# Patient Record
Sex: FEMALE | Race: BLACK OR AFRICAN AMERICAN | URBAN - METROPOLITAN AREA
[De-identification: names, ages, dates, MRNs, and addresses within clinical notes are randomized per-mention and may not be internally consistent; named-entity substitution may affect disease eponyms.]

---

## 2018-05-17 ENCOUNTER — OFFICE VISIT (OUTPATIENT)
Dept: INTERNAL MEDICINE CLINIC | Age: 18
End: 2018-05-17

## 2018-05-17 VITALS
RESPIRATION RATE: 16 BRPM | OXYGEN SATURATION: 98 % | HEART RATE: 87 BPM | DIASTOLIC BLOOD PRESSURE: 79 MMHG | BODY MASS INDEX: 37.15 KG/M2 | HEIGHT: 65 IN | SYSTOLIC BLOOD PRESSURE: 117 MMHG | WEIGHT: 223 LBS | TEMPERATURE: 98.6 F

## 2018-05-17 DIAGNOSIS — R21 RASH AND NONSPECIFIC SKIN ERUPTION: ICD-10-CM

## 2018-05-17 DIAGNOSIS — Z87.2 HISTORY OF ECZEMA: ICD-10-CM

## 2018-05-17 DIAGNOSIS — E66.9 OBESITY, UNSPECIFIED CLASSIFICATION, UNSPECIFIED OBESITY TYPE, UNSPECIFIED WHETHER SERIOUS COMORBIDITY PRESENT: ICD-10-CM

## 2018-05-17 DIAGNOSIS — Z76.89 ENCOUNTER TO ESTABLISH CARE: Primary | ICD-10-CM

## 2018-05-17 DIAGNOSIS — Z11.3 SCREENING FOR STD (SEXUALLY TRANSMITTED DISEASE): ICD-10-CM

## 2018-05-17 NOTE — MR AVS SNAPSHOT
[address] [facility] 
 
 
 [address] [place] [address] [facility] 
[phone] Patient: Nathan Bernard MRN: [id number] FRS:8/52/[id number] Visit Information Date & Time Provider Department Dept. Phone Encounter #  
 5/17/2018 10:45 AM Dina Alvarenga MD [facility] [address]  [id number] Upcoming Health Maintenance Date Due Hepatitis A Peds Age 1-18 (1 of 2 - Standard Series) 1/19/1999 DTaP/Tdap/Td series (1 - Tdap) 1/19/2005 HPV Age 9Y-34Y (1 of 3 - Female 3 Dose Series) 1/19/2009 Influenza Age 5 to Adult 8/1/2018 Allergies as of 5/17/2018  Review Complete On: 1/24/4195 By: Bharat Burt Wears No Known Allergies Current Immunizations  Never Reviewed No immunizations on file. Not reviewed this visit You Were Diagnosed With   
  
 Codes Comments Encounter to establish care    -  Primary ICD-10-CM: Z76.89 
ICD-9-CM: V65.8 Rash and nonspecific skin eruption     ICD-10-CM: R21 
ICD-9-CM: 782.1 History of eczema     ICD-10-CM: Z87.2 ICD-9-CM: V13.3 Screening for STD (sexually transmitted disease)     ICD-10-CM: Z11.3 ICD-9-CM: V74.5 Vitals BP Pulse Temp Resp Height(growth percentile) Weight(growth percentile) 117/79 (BP 1 Location: Left arm, BP Patient Position: Sitting) 87 98.6 °F (37 °C) (Oral) 16 5' 4.5\" (1.638 m) 223 lb (101.2 kg) LMP SpO2 BMI OB Status Smoking Status 04/29/2018 98% 37.69 kg/m2 IUD Never Smoker Vitals History BMI and BSA Data Body Mass Index Body Surface Area  
 37.69 kg/m 2 2.15 m 2 Preferred Pharmacy Pharmacy Name Phone [facility] [address] [place] [address] [phone] Your Updated Medication List  
  
   
This list is accurate as of 5/17/18 11:44 AM.  Always use your most recent med list.  
  
  
  
  
 * diphenhydrAMINE hcl 2 % topical gel Commonly known as:  BENADRYL Apply  to affected area every four (4) hours as needed for Allergies. * diphenhydrAMINE hcl 2 % topical gel Commonly known as:  BENADRYL Apply  to affected area every four (4) hours as needed for Allergies. KINSEY 14 mcg/24 hour (3 years) Iud IUD Generic drug:  levonorgestrel 1 Each by IntraUTERine route once. * Notice: This list has 2 medication(s) that are the same as other medications prescribed for you. Read the directions carefully, and ask your doctor or other care provider to review them with you. Prescriptions Sent to Pharmacy Refills diphenhydrAMINE hcl (BENADRYL) 2 % topical gel 1 Sig: Apply  to affected area every four (4) hours as needed for Allergies. Class: Normal  
 Pharmacy: [facility] [place] [address] Ph #: [phone] Route: Topical  
  
We Performed the Following CBC WITH AUTOMATED DIFF [96402 CPT(R)] HIV 1/2 AG/AB, 4TH GENERATION,W RFLX CONFIRM [id number] CPT(R)] METABOLIC PANEL, COMPREHENSIVE [37436 CPT(R)] RPR [76712 CPT(R)] VZV AB, IGG [id number] CPT(R)] VZV AB, IGM [id number] CPT(R)] Introducing [facility]! Karolyn Sneed introduces [employer] patient portal. Now you can access parts of your medical record, email your doctor's office, and request medication refills online. 1. In your internet browser, go to [email or web address]!. [email or web address]! 2. Click on the First Time User? Click Here link in the Sign In box. You will see the New Member Sign Up page. 3. Enter your [employer] Access Code exactly as it appears below. You will not need to use this code after youve completed the sign-up process. If you do not sign up before the expiration date, you must request a new code. · [employer] Access Code: [email or web address] Expires: 8/15/2018 11:44 AM 
 
4. Enter the last four digits of your Social Security Number (xxxx) and Date of Birth (mm/dd/yyyy) as indicated and click Submit.  You will be taken to the next sign-up page. 5. Create a [employer] ID. This will be your [employer] login ID and cannot be changed, so think of one that is secure and easy to remember. 6. Create a [employer] password. You can change your password at any time. 7. Enter your Password Reset Question and Answer. This can be used at a later time if you forget your password. 8. Enter your e-mail address. You will receive e-mail notification when new information is available in [address]. 9. Click Sign Up. You can now view and download portions of your medical record. 10. Click the Download Summary menu link to download a portable copy of your medical information. If you have questions, please visit the Frequently Asked Questions section of the [employer] website. Remember, [employer] is NOT to be used for urgent needs. For medical emergencies, dial 911. Now available from your iPhone and Android! Please provide this summary of care documentation to your next provider. If you have any questions after today's visit, please call [phone].

## 2018-05-17 NOTE — PROGRESS NOTES
Fanta Duran is a 19 y.o. female who presents today for Establish Care and Rash  . She has a history of There is no problem list on file for this patient. .    Today patient is here to establish care. Previous PCP Peds, Ansley Llamas. she is switching because adult. Records are not available for me to review. she does not have other concerns. Problem visit:  Fanta Duran is here for complaint of rash. Problem began 3 week(s) ago. Severity is moderate  Character of problem: Raised red lesions that were very pruritic. Heat makes the problem worse. Nothing makes the problem better. Associated symptoms include: No systemic symptoms. Denies changes in soaps or lotions. Has a history of eczema. Lives in [place] for school. Social: in ko year of college (multimedia and elementary education). 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 No tobacco, No EtOH. Sexually active with one partner. GYN: sees GYN locally. Family: Father:  from ? Heart issues. Mother: Arthritis. ROS  Review of Systems   Constitutional: Negative for chills, fever and weight loss. Respiratory: Negative for cough and shortness of breath. Cardiovascular: Negative for chest pain, palpitations and leg swelling. Gastrointestinal: Negative for abdominal pain, nausea and vomiting. Skin: Positive for itching and rash. Neurological: Negative. Endo/Heme/Allergies: Does not bruise/bleed easily. Psychiatric/Behavioral: Negative for depression. The patient is not nervous/anxious. Visit Vitals    /79 (BP 1 Location: Left arm, BP Patient Position: Sitting)    Pulse 87    Temp 98.6 °F (37 °C) (Oral)    Resp 16    Ht 5' 4.5\" (1.638 m)    Wt 223 lb (101.2 kg)    SpO2 98%    BMI 37.69 kg/m2       Physical Exam   Constitutional: She is oriented to person, place, and time and well-developed, well-nourished, and in no distress. No distress. HENT:   Head: Normocephalic and atraumatic. Mouth/Throat: No oropharyngeal exudate. Eyes: Conjunctivae are normal. Pupils are equal, round, and reactive to light. No scleral icterus. Neck: Normal range of motion. No JVD present. No thyromegaly present. Cardiovascular: Normal rate and regular rhythm. Exam reveals no gallop and no friction rub. No murmur heard. Pulmonary/Chest: Effort normal and breath sounds normal. No respiratory distress. She has no wheezes. Abdominal: Soft. Bowel sounds are normal. She exhibits no distension. There is no rebound and no guarding. Musculoskeletal: Normal range of motion. She exhibits no edema. Neurological: She is alert and oriented to person, place, and time. No cranial nerve deficit. Skin: Skin is dry. Rash noted. Mild raised rash to arms and legs. In different stages of healing. Do not appear infected. Resolving. Does not affect face or palms. Psychiatric: Mood, memory, affect and judgment normal.       Current Outpatient Prescriptions   Medication Sig    levonorgestrel (KINSEY) 14 mcg/24 hour (3 years) IUD IUD 1 Each by IntraUTERine route once.  diphenhydrAMINE hcl (BENADRYL) 2 % topical gel Apply  to affected area every four (4) hours as needed for Allergies.  diphenhydrAMINE hcl (BENADRYL) 2 % topical gel Apply  to affected area every four (4) hours as needed for Allergies. No current facility-administered medications for this visit. Past Medical History:   Diagnosis Date    Eczema       History reviewed. No pertinent surgical history. Social History   Substance Use Topics    Smoking status: Never Smoker    Smokeless tobacco: Never Used    Alcohol use No      Family History   Problem Relation Age of Onset    Arthritis-rheumatoid Mother     Heart Attack Maternal Grandmother     Hypertension Maternal Grandmother     Diabetes Maternal Grandfather         No Known Allergies     Assessment/Plan  Diagnoses and all orders for this visit:    1.  Encounter to establish care - Will get immunization records  -     RPR  -     HIV 1/2 AG/AB, 4TH GENERATION,W RFLX CONFIRM    2. Rash and nonspecific skin eruption - Unclear cause. Resolving. Appears viral, ? If VZV vaccine. Will check IgM. PRN antihistamine lotion. See JRD if does not improve. -     VZV AB, IGM  -     VZV AB, IGG  -     CBC WITH AUTOMATED DIFF  -     METABOLIC PANEL, COMPREHENSIVE  -     diphenhydrAMINE hcl (BENADRYL) 2 % topical gel; Apply  to affected area every four (4) hours as needed for Allergies. 3. History of eczema - not current rash. 4. Screening for STD (sexually transmitted disease)  -     RPR  -     HIV 1/2 AG/AB, 4TH GENERATION,W RFLX CONFIRM    5. Obesity, unspecified classification, unspecified obesity type, unspecified whether serious comorbidity present - counseled. On weight loss.          Follow-up Disposition: Not on File    Chidi Gayle MD  5/17/2018

## 2018-05-18 LAB
ALBUMIN SERPL-MCNC: 3.9 G/DL (ref 3.5–5.5)
ALBUMIN/GLOB SERPL: 1.3 {RATIO} (ref 1.2–2.2)
ALP SERPL-CCNC: 59 IU/L (ref 39–117)
ALT SERPL-CCNC: 8 IU/L (ref 0–32)
AST SERPL-CCNC: 16 IU/L (ref 0–40)
BASOPHILS # BLD AUTO: 0 X10E3/UL (ref 0–0.2)
BASOPHILS NFR BLD AUTO: 0 %
BILIRUB SERPL-MCNC: 0.2 MG/DL (ref 0–1.2)
BUN SERPL-MCNC: 10 MG/DL (ref 6–20)
BUN/CREAT SERPL: 16 (ref 9–23)
CALCIUM SERPL-MCNC: 8.8 MG/DL (ref 8.7–10.2)
CHLORIDE SERPL-SCNC: 104 MMOL/L (ref 96–106)
CO2 SERPL-SCNC: 24 MMOL/L (ref 18–29)
CREAT SERPL-MCNC: 0.64 MG/DL (ref 0.57–1)
EOSINOPHIL # BLD AUTO: 0.1 X10E3/UL (ref 0–0.4)
EOSINOPHIL NFR BLD AUTO: 2 %
ERYTHROCYTE [DISTWIDTH] IN BLOOD BY AUTOMATED COUNT: 13.2 % (ref 12.3–15.4)
GFR SERPLBLD CREATININE-BSD FMLA CKD-EPI: 129 ML/MIN/1.73
GFR SERPLBLD CREATININE-BSD FMLA CKD-EPI: 149 ML/MIN/1.73
GLOBULIN SER CALC-MCNC: 2.9 G/DL (ref 1.5–4.5)
GLUCOSE SERPL-MCNC: 94 MG/DL (ref 65–99)
HCT VFR BLD AUTO: 37.1 % (ref 34–46.6)
HGB BLD-MCNC: 12 G/DL (ref 11.1–15.9)
HIV 1+2 AB+HIV1 P24 AG SERPL QL IA: NON REACTIVE
IMM GRANULOCYTES # BLD: 0 X10E3/UL (ref 0–0.1)
IMM GRANULOCYTES NFR BLD: 0 %
LYMPHOCYTES # BLD AUTO: 2.3 X10E3/UL (ref 0.7–3.1)
LYMPHOCYTES NFR BLD AUTO: 33 %
MCH RBC QN AUTO: 28.4 PG (ref 26.6–33)
MCHC RBC AUTO-ENTMCNC: 32.3 G/DL (ref 31.5–35.7)
MCV RBC AUTO: 88 FL (ref 79–97)
MONOCYTES # BLD AUTO: 0.7 X10E3/UL (ref 0.1–0.9)
MONOCYTES NFR BLD AUTO: 9 %
NEUTROPHILS # BLD AUTO: 4 X10E3/UL (ref 1.4–7)
NEUTROPHILS NFR BLD AUTO: 56 %
PLATELET # BLD AUTO: 285 X10E3/UL (ref 150–379)
POTASSIUM SERPL-SCNC: 4.3 MMOL/L (ref 3.5–5.2)
PROT SERPL-MCNC: 6.8 G/DL (ref 6–8.5)
RBC # BLD AUTO: 4.22 X10E6/UL (ref 3.77–5.28)
RPR SER QL: NON REACTIVE
SODIUM SERPL-SCNC: 141 MMOL/L (ref 134–144)
VZV IGG SER IA-ACNC: 528 INDEX
VZV IGM SER IA-ACNC: <0.91 INDEX (ref 0–0.9)
WBC # BLD AUTO: 7.2 X10E3/UL (ref 3.4–10.8)

## 2018-06-20 ENCOUNTER — TELEPHONE (OUTPATIENT)
Dept: INTERNAL MEDICINE CLINIC | Age: 18
End: 2018-06-20

## 2018-06-20 DIAGNOSIS — Z01.82 ENCOUNTER FOR ALLERGY TESTING: Primary | ICD-10-CM

## 2018-06-20 DIAGNOSIS — Z87.2 HISTORY OF CHRONIC ECZEMA: ICD-10-CM

## 2018-06-20 DIAGNOSIS — R21 RASH AND OTHER NONSPECIFIC SKIN ERUPTION: ICD-10-CM

## 2018-06-25 NOTE — TELEPHONE ENCOUNTER
----- Message from Ramonia Power sent at 6/20/2018  9:16 AM EDT -----  Regarding: Dr. Sanchez Sigifredo  The patient is requesting that the doctor sends a referral to Dr. Abdul at [place] [address] [facility].  The patient's appointment is on 7/3/18. (Dr. Michoacano Galvez (u)[phone]
Please sign pending referral for Z01.82 / Theodora Carpenter / Z87.2 - thank you
Referral has been Obtained & Faxed To Dr Dulce Rubio Call Office [phone]    Auth #  [id number]  No # Visits  99  Dates Covered  06/25/2018 - 06/25/2019
all other ROS negative except as per HPI

## 2019-05-03 ENCOUNTER — OFFICE VISIT (OUTPATIENT)
Dept: FAMILY MEDICINE CLINIC | Age: 19
End: 2019-05-03

## 2019-05-03 VITALS
RESPIRATION RATE: 16 BRPM | HEART RATE: 84 BPM | WEIGHT: 200.4 LBS | HEIGHT: 65 IN | DIASTOLIC BLOOD PRESSURE: 70 MMHG | BODY MASS INDEX: 33.39 KG/M2 | SYSTOLIC BLOOD PRESSURE: 104 MMHG | OXYGEN SATURATION: 95 % | TEMPERATURE: 99.1 F

## 2019-05-03 DIAGNOSIS — Z13.31 SCREENING FOR DEPRESSION: ICD-10-CM

## 2019-05-03 DIAGNOSIS — Z23 ENCOUNTER FOR IMMUNIZATION: ICD-10-CM

## 2019-05-03 DIAGNOSIS — Z00.00 WELL WOMAN EXAM (NO GYNECOLOGICAL EXAM): ICD-10-CM

## 2019-05-03 DIAGNOSIS — Z76.89 ENCOUNTER TO ESTABLISH CARE: Primary | ICD-10-CM

## 2019-05-03 DIAGNOSIS — J03.01 RECURRENT STREPTOCOCCAL TONSILLITIS: ICD-10-CM

## 2019-05-03 DIAGNOSIS — Z11.3 ROUTINE SCREENING FOR STI (SEXUALLY TRANSMITTED INFECTION): ICD-10-CM

## 2019-05-03 DIAGNOSIS — E66.09 CLASS 1 OBESITY DUE TO EXCESS CALORIES WITHOUT SERIOUS COMORBIDITY WITH BODY MASS INDEX (BMI) OF 33.0 TO 33.9 IN ADULT: ICD-10-CM

## 2019-05-03 DIAGNOSIS — L30.9 ECZEMA, UNSPECIFIED TYPE: ICD-10-CM

## 2019-05-03 DIAGNOSIS — J30.2 SEASONAL ALLERGIC RHINITIS, UNSPECIFIED TRIGGER: ICD-10-CM

## 2019-05-03 NOTE — PROGRESS NOTES
Chief Complaint Patient presents with  New Patient  
  establish care  Other Refferal for ENT  Complete Physical  
  annual  
 
1. Have you been to the ER, urgent care clinic since your last visit? Hospitalized since your last visit yes for [facility] in [place] 3 weeks ago 2. Have you seen or consulted any other health care providers outside of the [address] [place] [address] since your last visit? Include any pap smears or colon screening.  No

## 2019-05-03 NOTE — PATIENT INSTRUCTIONS

## 2019-05-03 NOTE — PROGRESS NOTES
[facility] Note Subjective: Chief Complaint Patient presents with  New Patient  
  establish care  Other Refferal for ENT  Complete Physical  
  annual  
 
Davy Velazquez is a 22y.o. year old female who presents for evaluation of the following: 
 
Establishment of Care: 
Previous PCP: Pediatrician fabio funez seen 3 years ago Care Team:  
Dentist- Dr. Litzy Judd Optho- name unknwn GYN- Plans to establish care Patient Care Team: 
Melanie Valerio MD as PCP - [facility]) PMH:  
Nearsighted Wears contacts Eczema/ Nickel Allergy Location abdomen, arms, legs Tx: shea butter, cream uknown name Seasonal allergies: Tx: none Recurrent Strep Testin infection in past 1 year No current sore throat: fever Previous Tx: amoxicillin Request ENT referral  
 
 
Obesity:  
Body mass index is 33.59 kg/m². \"I'm happy\" Heaviest weight 250 Current weight Last 3 Recorded Weights in this Encounter 19 1319 Weight: 200 lb 6.4 oz (90.9 kg) Diet: usually no meat, limited dairy. Activity: None Acute Concerns: 
None Social:  
Works- Student at [employer]; rising senior, works as [employer]  Lives with mother Health Maintenance:  
Health Maintenance Topic Date Due  
 DTaP/Tdap/Td series (1 - Tdap) 2019  PAP AKA CERVICAL CYTOLOGY  2019  HPV Age 9Y-34Y (1 - Female 3-dose series) 2020 (Originally 2013)  Influenza Age 5 to Adult  2019  Pneumococcal 0-64 years  Aged Out HIV or other STD testing: Due 
Domestic Violence Screen:  
Abuse Screening Questionnaire 5/3/2019 Do you ever feel afraid of your partner? Genetta Croon Are you in a relationship with someone who physically or mentally threatens you? Genetta Croon Is it safe for you to go home? Siva Smith Depression Screen: Denies depression or anhedonia  
3 most recent PHQ Screens 5/3/2019 Little interest or pleasure in doing things Not at all Feeling down, depressed, irritable, or hopeless Not at all Total Score PHQ 2 0 Smoker? never G0 Pap:  Never. Wants to have done with GYN Mammogram: No family history Patient's last menstrual period was 04/30/2019 (exact date). Menses Monthly, lasting 3-5 days. No recent worsening bleeding or intermenstrual bleeding 
 reports that she currently engages in sexual activity and has had partners who are Male. She reports using the following method of birth control/protection: IUD. Review of Systems Pertinent positives and negative per HPI. All other systems  reviewed are negative for a Comprehensive ROS (10+). Past Medical History:  
Diagnosis Date  Eczema Social History Socioeconomic History  Marital status: SINGLE Spouse name: Not on file  Number of children: Not on file  Years of education: Not on file  Highest education level: Not on file Occupational History  Not on file Social Needs  Financial resource strain: Not on file  Food insecurity:  
  Worry: Not on file Inability: Not on file  Transportation needs:  
  Medical: Not on file Non-medical: Not on file Tobacco Use  Smoking status: Never Smoker  Smokeless tobacco: Never Used Substance and Sexual Activity  Alcohol use: Yes Comment: socially  Drug use: No  
 Sexual activity: Yes  
  Partners: Male Birth control/protection: IUD Lifestyle  Physical activity:  
  Days per week: Not on file Minutes per session: Not on file  Stress: Not on file Relationships  Social connections:  
  Talks on phone: Not on file Gets together: Not on file Attends [employer] service: Not on file Active member of club or organization: Not on file Attends meetings of clubs or organizations: Not on file Relationship status: Not on file  Intimate partner violence: Fear of current or ex partner: Not on file Emotionally abused: Not on file Physically abused: Not on file Forced sexual activity: Not on file Other Topics Concern  Not on file Social History Narrative  Not on file Family History Problem Relation Age of Onset  Arthritis-rheumatoid Mother  Heart Attack Maternal Grandmother  Hypertension Maternal Grandmother  Diabetes Maternal Grandfather  Heart Disease Father Current Outpatient Medications Medication Sig  levonorgestrel (KINSEY) 14 mcg/24 hour (3 years) IUD IUD 1 Each by IntraUTERine route once.  diphenhydrAMINE hcl (BENADRYL) 2 % topical gel Apply  to affected area every four (4) hours as needed for Allergies.  diphenhydrAMINE hcl (BENADRYL) 2 % topical gel Apply  to affected area every four (4) hours as needed for Allergies. No current facility-administered medications for this visit. Objective:  
 
Vitals:  
 05/03/19 1319 BP: 104/70 Pulse: 84 Resp: 16 Temp: 99.1 °F (37.3 °C) TempSrc: Oral  
SpO2: 95% Weight: 200 lb 6.4 oz (90.9 kg) Height: 5' 4.76\" (1.645 m) Physical Examination: 
General: Alert, cooperative, no distress, appears stated age. Obese Eyes: Conjunctivae clear. PERRL, EOMs intact. Ears: Normal external ear canals both ears. TM clear and mobile bilaterally Nose: Nares normal. Septum midline. Mucosa normal. No drainage or sinus tenderness. Mouth/Throat: Lips, mucosa, and tongue normal. No oropharyngeal erythema. No tonsillar enlargement or exudate. Neck: Supple, symmetrical, trachea midline, no adenopathy. No thyroid enlargement/tenderness/nodules Respiratory: Breathing comfortably, in no acute respiratory distress. Clear to auscultation bilaterally. Normal inspiratory and expiratory ratio. Cardiovascular: Regular rate and rhythm, S1, S2 normal, no murmur, click, rub or gallop. Extremities with no cyanosis or edema.  Pulses 2+ and symmetric radial and DP Abdomen: Soft, non-tender, non distended. Bowel sounds normal. No masses or organomegaly. Pelvic: Patient declined. MSK: Extremities normal, atraumatic, no effusion. Gait steady and unassisted. Back symmetric, no curvature. ROM normal. No CVA tenderness. Skin: Skin color, texture, turgor normal. No rashes or lesions on exposed skin. Lymph nodes: Cervical, supraclavicular nodes normal. 
Neurologic: CNII-XII intact. Strength 5/5 grossly. Sensation and reflexes normal throughout. Psychiatric: Affect appropriate. Mood euthymic. Thoughts logical. Speech volume and speed normal 
 
 
No visits with results within 3 Month(s) from this visit. Latest known visit with results is:  
Office Visit on 05/17/2018 Component Date Value Ref Range Status  V. zoster Ab, IgM 05/17/2018 <0.91  0.00 - 0.90 index Final  
 Comment:                                Negative          <0.91 Borderline  0.91 - 1.09 Positive          >1.09 
  
 VARICELLA ZOSTER IGG 05/17/2018 528  Immune >165 index Final  
 Comment:                                Negative          <135 Equivocal    135 - 165 Positive          >165 A positive result generally indicates exposure to the 
pathogen or administration of specific immunoglobulins, 
but it is not indication of active infection or stage 
of disease.  
  
 WBC 05/17/2018 7.2  3.4 - 10.8 x10E3/uL Final  
 RBC 05/17/2018 4.22  3.77 - 5.28 x10E6/uL Final  
 HGB 05/17/2018 12.0  11.1 - 15.9 g/dL Final  
 HCT 05/17/2018 37.1  34.0 - 46.6 % Final  
 MCV 05/17/2018 88  79 - 97 fL Final  
 MCH 05/17/2018 28.4  26.6 - 33.0 pg Final  
 MCHC 05/17/2018 32.3  31.5 - 35.7 g/dL Final  
 RDW 05/17/2018 13.2  12.3 - 15.4 % Final  
 PLATELET 51/64/5211 879  150 - 379 x10E3/uL Final  
 NEUTROPHILS 05/17/2018 56  Not Estab. % Final  
  Lymphocytes 05/17/2018 33  Not Estab. % Final  
 MONOCYTES 05/17/2018 9  Not Estab. % Final  
 EOSINOPHILS 05/17/2018 2  Not Estab. % Final  
 BASOPHILS 05/17/2018 0  Not Estab. % Final  
 ABS. NEUTROPHILS 05/17/2018 4.0  1.4 - 7.0 x10E3/uL Final  
 Abs Lymphocytes 05/17/2018 2.3  0.7 - 3.1 x10E3/uL Final  
 ABS. MONOCYTES 05/17/2018 0.7  0.1 - 0.9 x10E3/uL Final  
 ABS. EOSINOPHILS 05/17/2018 0.1  0.0 - 0.4 x10E3/uL Final  
 ABS. BASOPHILS 05/17/2018 0.0  0.0 - 0.2 x10E3/uL Final  
 IMMATURE GRANULOCYTES 05/17/2018 0  Not Estab. % Final  
 ABS. IMM. GRANS. 05/17/2018 0.0  0.0 - 0.1 x10E3/uL Final  
 Glucose 05/17/2018 94  65 - 99 mg/dL Final  
 BUN 05/17/2018 10  6 - 20 mg/dL Final  
 Creatinine 05/17/2018 0.64  0.57 - 1.00 mg/dL Final  
 GFR est non-AA 05/17/2018 129  >59 mL/min/1.73 Final  
 GFR est AA 05/17/2018 149  >59 mL/min/1.73 Final  
 BUN/Creatinine ratio 05/17/2018 16  9 - 23 Final  
 Sodium 05/17/2018 141  134 - 144 mmol/L Final  
 Potassium 05/17/2018 4.3  3.5 - 5.2 mmol/L Final  
 Chloride 05/17/2018 104  96 - 106 mmol/L Final  
 CO2 05/17/2018 24  18 - 29 mmol/L Final  
 Calcium 05/17/2018 8.8  8.7 - 10.2 mg/dL Final  
 Protein, total 05/17/2018 6.8  6.0 - 8.5 g/dL Final  
 Albumin 05/17/2018 3.9  3.5 - 5.5 g/dL Final  
 GLOBULIN, TOTAL 05/17/2018 2.9  1.5 - 4.5 g/dL Final  
 A-G Ratio 05/17/2018 1.3  1.2 - 2.2 Final  
 Bilirubin, total 05/17/2018 0.2  0.0 - 1.2 mg/dL Final  
 Alk. phosphatase 05/17/2018 59  39 - 117 IU/L Final  
 AST (SGOT) 05/17/2018 16  0 - 40 IU/L Final  
 ALT (SGPT) 05/17/2018 8  0 - 32 IU/L Final  
 RPR 05/17/2018 Non Reactive  Non Reactive Final  
 HIV SCREEN 4TH GENERATION WRFX 05/17/2018 Non Reactive  Non Reactive Final  
 
 
 
 
Assessment/ Plan:  
Diagnoses and all orders for this visit: 1. Encounter to establish care 2. Well woman exam (no gynecological exam) -     CBC WITH AUTOMATED DIFF 
-     METABOLIC PANEL, COMPREHENSIVE 
 -     LIPID PANEL 3. Eczema, unspecified type 4. Seasonal allergic rhinitis, unspecified trigger 5. Recurrent streptococcal tonsillitis 
-     REFERRAL TO ENT-OTOLARYNGOLOGY 6. Class 1 obesity due to excess calories without serious comorbidity with body mass index (BMI) of 33.0 to 33.9 in adult -     LIPID PANEL 7. Screening for depression 8. Routine screening for STI (sexually transmitted infection) 
-     CT/NG/T.VAGINALIS AMPLIFICATION 
-     HIV 1/2 AG/AB, 4TH GENERATION,W RFLX CONFIRM 9. Encounter for immunization -     TETANUS, DIPHTHERIA TOXOIDS AND ACELLULAR PERTUSSIS VACCINE (TDAP), IN INDIVIDS. >=7, IM 
-     NH IMMUNIZ ADMIN,1 SINGLE/COMB VAC/TOXOID 
 
 
 
 
PMH reviewed per orders. Previous records requested/ reviewed. Labs to eval end organ function and etiology of chronic/acute concerns. Relevant vaccine, cancer screening and other health maintenance reviewed and updated per orders. Diet and lifestyle modification encouraged for weight loss. Referral to specialist for evaluation of recurrent strep infection Negative depression screen. Educated patient on red flag symptoms to warrant return to clinic or emergency room visit. I have discussed the diagnosis with the patient and the intended plan as seen in the above orders. The patient has been offered or received an after-visit summary and questions were answered concerning future plans. I have discussed medication side effects and warnings with the patient as well. Follow-up and Dispositions · Return in about 6 months (around 11/3/2019) for Follow Up weight and recurrnt tonsillitis . Signed, Hari Hawkins MD 
5/3/2019

## 2019-05-05 LAB
ALBUMIN SERPL-MCNC: 3.9 G/DL (ref 3.5–5.5)
ALBUMIN/GLOB SERPL: 1.1 {RATIO} (ref 1.2–2.2)
ALP SERPL-CCNC: 52 IU/L (ref 39–117)
ALT SERPL-CCNC: 11 IU/L (ref 0–32)
AST SERPL-CCNC: 15 IU/L (ref 0–40)
BASOPHILS # BLD AUTO: 0.1 X10E3/UL (ref 0–0.2)
BASOPHILS NFR BLD AUTO: 1 %
BILIRUB SERPL-MCNC: 0.2 MG/DL (ref 0–1.2)
BUN SERPL-MCNC: 11 MG/DL (ref 6–20)
BUN/CREAT SERPL: 16 (ref 9–23)
C TRACH RRNA SPEC QL NAA+PROBE: NEGATIVE
CALCIUM SERPL-MCNC: 9.3 MG/DL (ref 8.7–10.2)
CHLORIDE SERPL-SCNC: 107 MMOL/L (ref 96–106)
CHOLEST SERPL-MCNC: 146 MG/DL (ref 100–199)
CO2 SERPL-SCNC: 21 MMOL/L (ref 20–29)
CREAT SERPL-MCNC: 0.69 MG/DL (ref 0.57–1)
EOSINOPHIL # BLD AUTO: 0.1 X10E3/UL (ref 0–0.4)
EOSINOPHIL NFR BLD AUTO: 2 %
ERYTHROCYTE [DISTWIDTH] IN BLOOD BY AUTOMATED COUNT: 13.6 % (ref 12.3–15.4)
GLOBULIN SER CALC-MCNC: 3.5 G/DL (ref 1.5–4.5)
GLUCOSE SERPL-MCNC: 88 MG/DL (ref 65–99)
HCT VFR BLD AUTO: 40.5 % (ref 34–46.6)
HDLC SERPL-MCNC: 41 MG/DL
HGB BLD-MCNC: 13.4 G/DL (ref 11.1–15.9)
HIV 1+2 AB+HIV1 P24 AG SERPL QL IA: NON REACTIVE
IMM GRANULOCYTES # BLD AUTO: 0 X10E3/UL (ref 0–0.1)
IMM GRANULOCYTES NFR BLD AUTO: 0 %
INTERPRETATION, 910389: NORMAL
LDLC SERPL CALC-MCNC: 99 MG/DL (ref 0–99)
LYMPHOCYTES # BLD AUTO: 2.2 X10E3/UL (ref 0.7–3.1)
LYMPHOCYTES NFR BLD AUTO: 43 %
MCH RBC QN AUTO: 29.2 PG (ref 26.6–33)
MCHC RBC AUTO-ENTMCNC: 33.1 G/DL (ref 31.5–35.7)
MCV RBC AUTO: 88 FL (ref 79–97)
MONOCYTES # BLD AUTO: 0.5 X10E3/UL (ref 0.1–0.9)
MONOCYTES NFR BLD AUTO: 10 %
N GONORRHOEA RRNA SPEC QL NAA+PROBE: NEGATIVE
NEUTROPHILS # BLD AUTO: 2.3 X10E3/UL (ref 1.4–7)
NEUTROPHILS NFR BLD AUTO: 44 %
PLATELET # BLD AUTO: 321 X10E3/UL (ref 150–379)
POTASSIUM SERPL-SCNC: 4.6 MMOL/L (ref 3.5–5.2)
PROT SERPL-MCNC: 7.4 G/DL (ref 6–8.5)
RBC # BLD AUTO: 4.59 X10E6/UL (ref 3.77–5.28)
SODIUM SERPL-SCNC: 140 MMOL/L (ref 134–144)
T VAGINALIS RRNA VAG QL NAA+PROBE: NEGATIVE
TRIGL SERPL-MCNC: 28 MG/DL (ref 0–149)
VLDLC SERPL CALC-MCNC: 6 MG/DL (ref 5–40)
WBC # BLD AUTO: 5.1 X10E3/UL (ref 3.4–10.8)

## 2019-05-06 ENCOUNTER — DOCUMENTATION ONLY (OUTPATIENT)
Dept: FAMILY MEDICINE CLINIC | Age: 19
End: 2019-05-06

## 2019-05-06 ENCOUNTER — TELEPHONE (OUTPATIENT)
Dept: FAMILY MEDICINE CLINIC | Age: 19
End: 2019-05-06

## 2019-05-06 NOTE — TELEPHONE ENCOUNTER
Immunization record and note to return to school printed for patient placed up front for mother to pick  Up

## 2019-05-06 NOTE — TELEPHONE ENCOUNTER
Patient is calling stating that her discharged paper, office visit notes got mixed up with another patient, she is requesting her updated immunization records and her excuse from school letter  She will call back to tell us how she wants her papers as she is not in [place] anymore. Lives in [facility]  Pt called back , She would like her mother Esperanza Acevedo to  the records today.      LOV :  May 03, 2019   [facility]# [phone]

## 2019-05-13 NOTE — PROGRESS NOTES
Notify Patient: 
All of your test results are normal including screening for HIV, syphilis, gonorrhea, chlamydia and trichomonas.

## 2019-09-26 ENCOUNTER — TELEPHONE (OUTPATIENT)
Dept: FAMILY MEDICINE CLINIC | Age: 19
End: 2019-09-26

## 2019-09-26 DIAGNOSIS — Z01.00 EYE EXAM, ROUTINE: Primary | ICD-10-CM

## 2019-09-26 NOTE — TELEPHONE ENCOUNTER
Patient need a referral order to see Dr. Mane Ly     For a yearly exam .    Name of practice: Dr. Luz Elena Edward title, first, and last name: Opthalmologist Dr. Simone Ross Phone Number: [phone]        Fax number: [phone]        Date and time of appointment: 10/15/19 At 7:15 AM        Reason for appointment: Yearly eye exam        Details to clarify the request: Pt needs a referral to see Dr. Mane Ly on 10/15/19 she can be reached at [phone].        Kristin Nicole

## 2022-03-18 PROBLEM — E66.09 CLASS 1 OBESITY DUE TO EXCESS CALORIES WITHOUT SERIOUS COMORBIDITY WITH BODY MASS INDEX (BMI) OF 33.0 TO 33.9 IN ADULT: Status: ACTIVE | Noted: 2019-05-03

## 2022-03-19 PROBLEM — L30.9 ECZEMA: Status: ACTIVE | Noted: 2019-05-03

## 2022-03-20 PROBLEM — J03.01 RECURRENT STREPTOCOCCAL TONSILLITIS: Status: ACTIVE | Noted: 2019-05-03
